# Patient Record
Sex: MALE | Race: WHITE | NOT HISPANIC OR LATINO | URBAN - METROPOLITAN AREA
[De-identification: names, ages, dates, MRNs, and addresses within clinical notes are randomized per-mention and may not be internally consistent; named-entity substitution may affect disease eponyms.]

---

## 2019-12-27 ENCOUNTER — EMERGENCY (EMERGENCY)
Facility: HOSPITAL | Age: 54
LOS: 0 days | Discharge: LEFT BEFORE DISCHARGE | End: 2019-12-27
Admitting: EMERGENCY MEDICINE
Payer: COMMERCIAL

## 2019-12-27 VITALS
RESPIRATION RATE: 17 BRPM | TEMPERATURE: 98 F | HEART RATE: 96 BPM | DIASTOLIC BLOOD PRESSURE: 61 MMHG | OXYGEN SATURATION: 98 % | SYSTOLIC BLOOD PRESSURE: 119 MMHG

## 2019-12-27 DIAGNOSIS — R53.1 WEAKNESS: ICD-10-CM

## 2019-12-27 DIAGNOSIS — Z53.21 PROCEDURE AND TREATMENT NOT CARRIED OUT DUE TO PATIENT LEAVING PRIOR TO BEING SEEN BY HEALTH CARE PROVIDER: ICD-10-CM

## 2019-12-27 DIAGNOSIS — R07.9 CHEST PAIN, UNSPECIFIED: ICD-10-CM

## 2019-12-27 PROCEDURE — L9992: CPT

## 2019-12-27 PROCEDURE — 93010 ELECTROCARDIOGRAM REPORT: CPT

## 2023-12-19 ENCOUNTER — APPOINTMENT (OUTPATIENT)
Dept: ORTHOPEDIC SURGERY | Facility: CLINIC | Age: 58
End: 2023-12-19
Payer: COMMERCIAL

## 2023-12-19 VITALS — WEIGHT: 165 LBS | BODY MASS INDEX: 24.44 KG/M2 | HEIGHT: 69 IN

## 2023-12-19 DIAGNOSIS — M18.11 UNILATERAL PRIMARY OSTEOARTHRITIS OF FIRST CARPOMETACARPAL JOINT, RIGHT HAND: ICD-10-CM

## 2023-12-19 DIAGNOSIS — M18.12 UNILATERAL PRIMARY OSTEOARTHRITIS OF FIRST CARPOMETACARPAL JOINT, LEFT HAND: ICD-10-CM

## 2023-12-19 PROBLEM — Z00.00 ENCOUNTER FOR PREVENTIVE HEALTH EXAMINATION: Status: ACTIVE | Noted: 2023-12-19

## 2023-12-19 PROCEDURE — 99202 OFFICE O/P NEW SF 15 MIN: CPT | Mod: 25

## 2023-12-19 PROCEDURE — 20605 DRAIN/INJ JOINT/BURSA W/O US: CPT | Mod: 50

## 2023-12-19 NOTE — ASSESSMENT
[FreeTextEntry1] : Patient has bilateral basal joint arthritis.  The natural history of the condition is discussed with the patient.  Cortisone injections were discussed anti-inflammatories were discussed bracing was discussed no surgical intervention was discussed.  He is opting for cortisone injections is given to him today.  Tolerated well.  He knows no new injection for at least 6 months.

## 2023-12-19 NOTE — PHYSICAL EXAM
[de-identified] : Patient has tenderness along the right and left basal joint.  Positive axial grind negative Tinel's and median nerve compression test no erythema ecchymoses or abrasions.  No instability at the CMC or MP joint.

## 2023-12-19 NOTE — HISTORY OF PRESENT ILLNESS
[de-identified] : 58-year-old male comes in with pain and discomfort in both thumbs.  Bothering him for a while.  He has had no treatment for the condition.  And he comes in for the evaluation today.

## 2023-12-19 NOTE — PROCEDURE
[FreeTextEntry3] : Thumb CMC injection was performed because of pain inflammation and stiffness Anesthesia: ethyl chloride sprayed topically Dexamethasone: An injection of Dexamethasone 1cc  4mg/ml Lidocaine: An injection of Lidocaine 1% 1cc  Patient has tried OTC's including aspirin, Ibuprofen, Aleve etc or prescription NSAIDS, and/or exercises at home and/ or physical therapy without satisfactory response. After verbal consent using sterile preparation and technique. The risks, benefits, and alternatives to cortisone injection were explained in full to the patient. Risks outlined include but are not limited to infection, sepsis, bleeding, scarring, skin discoloration, temporary increase in pain, syncopal episode, failure to resolve symptoms, allergic reaction, symptom recurrence, and elevation of blood sugar in diabetics. Patient understood the risks. All questions were answered. After discussion of options, patient requested an injection. Oral informed consent was obtained and sterile prep was done of the injection site. Sterile technique was utilized for the procedure including the preparation of the solutions used for the injection. Patient tolerated the procedure well. Advised to ice the injection site this evening. Prep with ETOH locally to site. Sterile technique used.  The basal joint of the thumb was injected  Bilateral basal joint thumb